# Patient Record
Sex: FEMALE | Race: WHITE | NOT HISPANIC OR LATINO | Employment: UNEMPLOYED | URBAN - METROPOLITAN AREA
[De-identification: names, ages, dates, MRNs, and addresses within clinical notes are randomized per-mention and may not be internally consistent; named-entity substitution may affect disease eponyms.]

---

## 2023-08-02 ENCOUNTER — HOSPITAL ENCOUNTER (EMERGENCY)
Facility: HOSPITAL | Age: 38
Discharge: HOME | End: 2023-08-02
Attending: EMERGENCY MEDICINE
Payer: MEDICAID

## 2023-08-02 VITALS
TEMPERATURE: 98 F | BODY MASS INDEX: 30.73 KG/M2 | RESPIRATION RATE: 18 BRPM | DIASTOLIC BLOOD PRESSURE: 89 MMHG | WEIGHT: 180 LBS | HEIGHT: 64 IN | OXYGEN SATURATION: 97 % | SYSTOLIC BLOOD PRESSURE: 125 MMHG | HEART RATE: 93 BPM

## 2023-08-02 DIAGNOSIS — Z59.00 HOMELESS: ICD-10-CM

## 2023-08-02 DIAGNOSIS — Z32.02 PREGNANCY EXAMINATION OR TEST, NEGATIVE RESULT: Primary | ICD-10-CM

## 2023-08-02 DIAGNOSIS — Z87.898 HISTORY OF SEIZURES: ICD-10-CM

## 2023-08-02 DIAGNOSIS — Z76.0 ENCOUNTER FOR MEDICATION REFILL: ICD-10-CM

## 2023-08-02 LAB
B-HCG UR QL: NEGATIVE
CTP QC/QA: YES

## 2023-08-02 PROCEDURE — 25000003 PHARM REV CODE 250: Performed by: PHYSICIAN ASSISTANT

## 2023-08-02 PROCEDURE — 99283 EMERGENCY DEPT VISIT LOW MDM: CPT

## 2023-08-02 PROCEDURE — 81025 URINE PREGNANCY TEST: CPT | Performed by: PHYSICIAN ASSISTANT

## 2023-08-02 RX ORDER — ACETAMINOPHEN 325 MG/1
650 TABLET ORAL
Status: COMPLETED | OUTPATIENT
Start: 2023-08-02 | End: 2023-08-02

## 2023-08-02 RX ORDER — DIVALPROEX SODIUM 500 MG/1
500 TABLET, FILM COATED, EXTENDED RELEASE ORAL NIGHTLY
Qty: 30 TABLET | Refills: 0 | Status: SHIPPED | OUTPATIENT
Start: 2023-08-02 | End: 2023-09-01

## 2023-08-02 RX ADMIN — ACETAMINOPHEN 650 MG: 325 TABLET ORAL at 03:08

## 2023-08-02 SDOH — SOCIAL DETERMINANTS OF HEALTH (SDOH): HOMELESSNESS UNSPECIFIED: Z59.00

## 2023-08-02 NOTE — ED NOTES
Patient states headache an dbil feet pain x 2 hours, states she has been staying outside, callusn n oted to bottom destini feet.

## 2023-08-02 NOTE — ED NOTES
Patient identifiers verified and correct for  Ms Palacios  C/C:  Headache, Social service issue SEE NN  APPEARANCE: awake and alert in NAD. PAIN  10/10  SKIN: warm, dry and intact. No breakdown or bruising.  MUSCULOSKELETAL: Patient moving all extremities spontaneously, no obvious swelling or deformities noted. Ambulates independently.  RESPIRATORY: Denies shortness of breath.Respirations unlabored.   CARDIAC: Denies CP, 2+ distal pulses; no peripheral edema  ABDOMEN: S/ND/NT, Denies nausea  : voids spontaneously, denies difficulty  Neurologic: AAO x 4; follows commands equal strength in all extremities; denies numbness/tingling. Denies dizziness  Deneis new weakness

## 2023-08-02 NOTE — ED PROVIDER NOTES
"Encounter Date: 8/2/2023       History     Chief Complaint   Patient presents with    Headache    Foot Pain     Wants a pregnancy test     The patient is a 30-year-old female.  She has a past medical history significant for seizure disorder, mental illness, headaches, substance abuse, tobacco use, and miscarriages.  She presents to the ER with multiple requests.  She states that she was just discharged from an inpatient psychiatric facility in [facility] yesterday morning.  She states that she was provided transport and requested to come to [place], as she has a friend here who told her she was welcome to stay.  However, once she arrived yesterday, she has been unable to get in touch with her friend and says she "has no where to go ".  She states that she had sleep on the street last night and that she had to walk to the hospital.  She states that she has a mild headache, and that both of her feet ache.  She is requesting something for pain.  She denies any additional physical complaints at this time.  She is requesting information on local homeless shelters. She is also requesting a pregnancy test. She states that she only had 1 day of spotting this month and not a normal menstruation.  She states that she is sexually active and not on birth control. She has not taken a pregnancy test at this time. She denies any pelvic pain or vaginal bleeding.  Additionally, she is requesting a prescription refill of either Depakote or Dilantin.  She states that she has history of seizure disorder since adolescence and she has been prescribed both in the past. She states that she has not taken any anticonvulsants in over 6 months.  She states both medications have been effective and well tolerated previously, she is on certain which she was last on, but prefers Depakote.  Finally she is requesting something to eat.  She states that she has not had anything since this morning.    Odd that 0 suggesting  Review of patient's " allergies indicates:   Allergen Reactions    Penicillins      History reviewed. No pertinent past medical history.  History reviewed. No pertinent surgical history.  History reviewed. No pertinent family history.  Social History     Tobacco Use    Smoking status: Every Day     Current packs/day: 0.00     Types: Cigarettes    Smokeless tobacco: Never   Substance Use Topics    Alcohol use: Not Currently    Drug use: Yes     Types: Marijuana     Review of Systems   Constitutional:  Negative for activity change, appetite change, chills and fever.   Eyes:  Negative for visual disturbance.   Respiratory:  Negative for cough and shortness of breath.    Cardiovascular:  Negative for chest pain.   Gastrointestinal:  Negative for abdominal pain, diarrhea, nausea and vomiting.   Genitourinary:  Positive for menstrual problem. Negative for decreased urine volume, difficulty urinating, dysuria, flank pain, frequency, pelvic pain, urgency, vaginal bleeding and vaginal discharge.   Musculoskeletal:  Negative for arthralgias, back pain, gait problem, joint swelling, myalgias, neck pain and neck stiffness.        Bilateral foot pain   Skin:  Negative for rash.   Allergic/Immunologic: Negative for immunocompromised state.   Neurological:  Positive for headaches. Negative for dizziness, tremors, seizures, syncope, facial asymmetry, speech difficulty, weakness, light-headedness and numbness.   Psychiatric/Behavioral:  Negative for agitation, behavioral problems, confusion, dysphoric mood, hallucinations, self-injury, sleep disturbance and suicidal ideas. The patient is not nervous/anxious.        Physical Exam     Initial Vitals   BP Pulse Resp Temp SpO2   08/02/23 1430 08/02/23 1430 08/02/23 1430 08/02/23 1431 08/02/23 1431   132/88 (!) 113 16 98.6 °F (37 °C) 97 %      MAP       --                Physical Exam    Nursing note and vitals reviewed.  Constitutional: She appears well-developed and well-nourished.   Alert and ambulatory.   Well-appearing.  No obvious distress this time.   HENT:   Head: Normocephalic and atraumatic.   Moist oral mucosa.  Unremarkable ENT exam.   Eyes: Conjunctivae and EOM are normal. Pupils are equal, round, and reactive to light.   Neck: Neck supple.   Cardiovascular:  Normal rate.           Pulmonary/Chest: No respiratory distress. She has no wheezes.   Abdominal: Abdomen is soft. She exhibits no distension. There is no abdominal tenderness.   Musculoskeletal:         General: No tenderness or edema. Normal range of motion.      Cervical back: Neck supple.      Comments: Bilateral foot pain reported after having to walk to the hospital, exam unremarkable, atraumatic, no edema or erythema.  No ecchymosis.     Neurological: She is alert and oriented to person, place, and time. She has normal strength. No sensory deficit.   Normal speech.  Normal gait.  Oriented appropriately.  No focal deficit.   Skin: Skin is warm and dry. No rash noted.   Psychiatric: She has a normal mood and affect. Her behavior is normal.   Denies SI/HI.  Denies substance abuse.  Feels safe.  Good eye contact.  Appropriate insight.  Judgment seems appropriate.         ED Course   Procedures  Labs Reviewed   POCT URINE PREGNANCY          Imaging Results    None          Medications   acetaminophen tablet 650 mg (650 mg Oral Given 8/2/23 1289)     Medical Decision Making:   History:   Old Medical Records: I decided to obtain old medical records.  Old Records Summarized: other records.       <> Summary of Records: Notes from previous ER visits  Initial Assessment:   30-year-old female presenting to the ER with multiple requests.  She does complain of mild headache and bilateral foot pain, otherwise no acute physical complaints.  Recently discharged from inpatient psychiatric admission and an another city and driven to [place] now without a place to stay.  Asking for something to eat and homeless shelter info.  Also requesting UPT.  Also  requesting prescription refill of Depakote for seizure prevention.  Differential Diagnosis:   Abnormal menses, pregnancy, homelessness, mental illness, seizure disorder, etc.  Clinical Tests:   Lab Tests: Ordered and Reviewed  ED Management:  Vital signs reviewed, benign  Records reviewed  Tylenol given for pain  UPT done per patient request due to history of abnormal light menstrual cycle this month, pregnancy test is negative  Recommended and offered to do basic labs to screen for dehydration and electrolyte abnormality however she declined does not want blood work  I discussed the case in detail with the ER attending physician regarding refill of anticonvulsant medication; ER attending physician recommends providing her with Depakote as opposed to Dilantin to avoid having to load, medication profile on record has her dose listed, prescription refill provided   consult ordered for assistance with homelessness, list of local homeless shelters and resources provided   Patient provided food and drink   Patient advised to follow up with her regular doctor within the next 2-3 days for re-evaluation and further management  Patient instructed to return to the ER promptly if worse in any way            Attending Attestation:     Physician Attestation Statement for NP/PA:   I have directed and reviewed the workup performed by the PA/NP.  I performed the substantive portion of the medical decision making.                            Clinical Impression:   Final diagnoses:  [Z32.02] Pregnancy examination or test, negative result (Primary)  [Z87.898] History of seizures  [Z59.00] Homeless  [Z76.0] Encounter for medication refill        ED Disposition Condition    Discharge Stable          ED Prescriptions       Medication Sig Dispense Start Date End Date Auth. Provider    divalproex 500 MG Tb24 Take 1 tablet (500 mg total) by mouth every evening. 30 tablet 8/2/2023 9/1/2023 Ronen Bradsahw PA-C           Follow-up Information       Follow up With Specialties Details Why Contact Info    [facility] - Emergency Dept Emergency Medicine  Return to the ER if worse in any way [id number]  [facility] [id number] [phone]             Ronen Bradshaw, REN  08/03/23 1400       Qi Romero MD  08/18/23 [id number]

## 2023-08-02 NOTE — ED NOTES
Patient states she was released from mental health facility yesterday,they gave her a ride to [place] to stay with a friend, unable to find friend, stayed outside last night. Deneis suicidal or homicidal ideations, offered food tray